# Patient Record
Sex: MALE | Race: WHITE | Employment: FULL TIME | URBAN - METROPOLITAN AREA
[De-identification: names, ages, dates, MRNs, and addresses within clinical notes are randomized per-mention and may not be internally consistent; named-entity substitution may affect disease eponyms.]

---

## 2018-09-06 ENCOUNTER — OFFICE VISIT (OUTPATIENT)
Dept: INTERNAL MEDICINE CLINIC | Age: 23
End: 2018-09-06

## 2018-09-06 VITALS
DIASTOLIC BLOOD PRESSURE: 70 MMHG | HEART RATE: 83 BPM | WEIGHT: 142 LBS | SYSTOLIC BLOOD PRESSURE: 110 MMHG | OXYGEN SATURATION: 99 % | TEMPERATURE: 98.4 F | BODY MASS INDEX: 20.37 KG/M2

## 2018-09-06 DIAGNOSIS — J20.9 ACUTE BRONCHITIS, UNSPECIFIED ORGANISM: Primary | ICD-10-CM

## 2018-09-06 PROCEDURE — 99213 OFFICE O/P EST LOW 20 MIN: CPT | Performed by: INTERNAL MEDICINE

## 2018-09-06 RX ORDER — AZITHROMYCIN 250 MG/1
TABLET, FILM COATED ORAL
Qty: 1 PACKET | Refills: 0 | Status: SHIPPED | OUTPATIENT
Start: 2018-09-06 | End: 2018-09-16

## 2018-09-06 ASSESSMENT — PATIENT HEALTH QUESTIONNAIRE - PHQ9
SUM OF ALL RESPONSES TO PHQ9 QUESTIONS 1 & 2: 0
SUM OF ALL RESPONSES TO PHQ QUESTIONS 1-9: 0
1. LITTLE INTEREST OR PLEASURE IN DOING THINGS: 0
2. FEELING DOWN, DEPRESSED OR HOPELESS: 0
SUM OF ALL RESPONSES TO PHQ QUESTIONS 1-9: 0

## 2018-09-06 ASSESSMENT — ENCOUNTER SYMPTOMS
COUGH: 1
VOMITING: 0
SHORTNESS OF BREATH: 1
NAUSEA: 0

## 2018-09-06 NOTE — PROGRESS NOTES
2018     Keegan Hernandez II (:  1995) is a 23 y.o. male, here for evaluation of the following medical concerns:    Chief Complaint   Patient presents with    Cough        HPI    STARTED 10 days ago  Last 4 days worse  Coughs up clear/white sputum  Loss of appetite  Little rhinitis  Headaches periodically  Dyspnea  No otc meds used  Coughs more with activity  No facial pressure  Post nasal drip  No change in bms, maybe  Little constipated  No n no v    Review of Systems   Constitutional: Positive for appetite change and fatigue. Negative for chills and fever. Respiratory: Positive for cough and shortness of breath. Cardiovascular: Negative for chest pain and leg swelling. Gastrointestinal: Negative for nausea and vomiting. Neurological: Positive for headaches. Negative for syncope. Prior to Visit Medications    Medication Sig Taking? Authorizing Provider   SUMAtriptan (IMITREX) 50 MG tablet TAKE 1 TABLET BY MOUTH AS NEEDED, MAY REPEAT ONCE IN 24 HOURS AT LEAST 2 HOURS AFTER FIRST DOSE  Shandra Boston MD        Social History   Substance Use Topics    Smoking status: Never Smoker    Smokeless tobacco: Never Used    Alcohol use No        Vitals:    18 1136   BP: 110/70   Site: Left Arm   Position: Sitting   Cuff Size: Medium Adult   Pulse: 83   Temp: 98.4 °F (36.9 °C)   TempSrc: Oral   SpO2: 99%   Weight: 142 lb (64.4 kg)     Estimated body mass index is 20.37 kg/m² as calculated from the following:    Height as of 10/19/16: 5' 10\" (1.778 m). Weight as of this encounter: 142 lb (64.4 kg). Physical Exam   Constitutional: He is oriented to person, place, and time. He appears well-developed and well-nourished. HENT:   Head: Normocephalic and atraumatic. Nose: Nose normal.   Mouth/Throat: Oropharynx is clear and moist. No oropharyngeal exudate. Eyes: Pupils are equal, round, and reactive to light. No scleral icterus. Neck: Normal range of motion. Cardiovascular: Normal rate and regular rhythm. Pulmonary/Chest: Effort normal. No respiratory distress. He has no wheezes. He has no rales. Inspiratory rhonchi, scattered  Not dull to percussion   Lymphadenopathy:     He has no cervical adenopathy. Neurological: He is alert and oriented to person, place, and time. Vitals reviewed. ASSESSMENT/PLAN:  1. Acute bronchitis, unspecified organism  Discussed  zpack side effects of the medication were discussed   Please call if symptoms worsen or do not improve. No Follow-up on file. An electronic signature was used to authenticate this note.     --Carey Bowman MD on 9/6/2018 at 11:59 AM

## 2018-09-06 NOTE — LETTER
[facility] [address]  [address]  [place] [address]  Phone: [phone]  Fax: [phone]    Sam Kong MD        September 6, 2018     Patient: Sita Hebert II   YOB: 1995   Date of Visit: 9/6/2018       To Whom It May Concern: It is my medical opinion that Héctor Blackman should refrain from heavy exertion until his bronchitis has resolved. If you have any questions or concerns, please don't hesitate to call.     Sincerely,        Sam Kong MD

## 2019-03-08 ENCOUNTER — OFFICE VISIT (OUTPATIENT)
Dept: INTERNAL MEDICINE CLINIC | Age: 24
End: 2019-03-08
Payer: COMMERCIAL

## 2019-03-08 VITALS
HEART RATE: 97 BPM | BODY MASS INDEX: 21.05 KG/M2 | WEIGHT: 147 LBS | DIASTOLIC BLOOD PRESSURE: 64 MMHG | OXYGEN SATURATION: 100 % | HEIGHT: 70 IN | SYSTOLIC BLOOD PRESSURE: 116 MMHG

## 2019-03-08 DIAGNOSIS — M25.511 CHRONIC RIGHT SHOULDER PAIN: ICD-10-CM

## 2019-03-08 DIAGNOSIS — G56.02 CARPAL TUNNEL SYNDROME OF LEFT WRIST: Primary | ICD-10-CM

## 2019-03-08 DIAGNOSIS — G89.29 CHRONIC RIGHT SHOULDER PAIN: ICD-10-CM

## 2019-03-08 PROCEDURE — 99214 OFFICE O/P EST MOD 30 MIN: CPT | Performed by: INTERNAL MEDICINE

## 2019-03-08 RX ORDER — PREDNISONE 20 MG/1
TABLET ORAL
Qty: 18 TABLET | Refills: 0 | Status: SHIPPED | OUTPATIENT
Start: 2019-03-08 | End: 2019-03-18

## 2019-03-08 RX ORDER — DICLOFENAC SODIUM 75 MG/1
75 TABLET, DELAYED RELEASE ORAL 2 TIMES DAILY
Qty: 60 TABLET | Refills: 3 | Status: SHIPPED | OUTPATIENT
Start: 2019-03-08

## 2019-03-08 ASSESSMENT — PATIENT HEALTH QUESTIONNAIRE - PHQ9
1. LITTLE INTEREST OR PLEASURE IN DOING THINGS: 0
SUM OF ALL RESPONSES TO PHQ QUESTIONS 1-9: 0
SUM OF ALL RESPONSES TO PHQ QUESTIONS 1-9: 0
SUM OF ALL RESPONSES TO PHQ9 QUESTIONS 1 & 2: 0
2. FEELING DOWN, DEPRESSED OR HOPELESS: 0

## 2021-05-14 ENCOUNTER — VIRTUAL VISIT (OUTPATIENT)
Dept: PRIMARY CARE CLINIC | Age: 26
End: 2021-05-14
Payer: COMMERCIAL

## 2021-05-14 DIAGNOSIS — B34.9 ACUTE VIRAL SYNDROME: Primary | ICD-10-CM

## 2021-05-14 DIAGNOSIS — J06.9 URI WITH COUGH AND CONGESTION: ICD-10-CM

## 2021-05-14 PROCEDURE — 99214 OFFICE O/P EST MOD 30 MIN: CPT | Performed by: INTERNAL MEDICINE

## 2021-05-14 RX ORDER — AZITHROMYCIN 250 MG/1
250 TABLET, FILM COATED ORAL SEE ADMIN INSTRUCTIONS
Qty: 6 TABLET | Refills: 0 | Status: SHIPPED | OUTPATIENT
Start: 2021-05-14 | End: 2021-05-19

## 2021-05-14 RX ORDER — GUAIFENESIN AND PSEUDOEPHEDRINE HCL 1200; 120 MG/1; MG/1
TABLET, EXTENDED RELEASE ORAL
Qty: 20 TABLET | Refills: 0 | Status: SHIPPED | OUTPATIENT
Start: 2021-05-14

## 2021-05-14 SDOH — ECONOMIC STABILITY: FOOD INSECURITY: WITHIN THE PAST 12 MONTHS, YOU WORRIED THAT YOUR FOOD WOULD RUN OUT BEFORE YOU GOT MONEY TO BUY MORE.: NEVER TRUE

## 2021-05-14 ASSESSMENT — PATIENT HEALTH QUESTIONNAIRE - PHQ9
SUM OF ALL RESPONSES TO PHQ QUESTIONS 1-9: 0
1. LITTLE INTEREST OR PLEASURE IN DOING THINGS: 0
2. FEELING DOWN, DEPRESSED OR HOPELESS: 0

## 2021-05-14 NOTE — LETTER
[address] [place] [address]  Phone: [phone]  Fax: [phone]    Anuj Lind MD        May 14, 2021     Patient: Kaelyn Andrade II   YOB: 1995   Date of Visit: 5/14/2021       To Whom It May Concern: It is my medical opinion that Juan Carlos Abdul has an acute upper respiratory infection. He needs to be excused from [address] on 5/15 and 5/16/21. .    If you have any questions or concerns, please don't hesitate to call.     Sincerely,        Anuj Lind MD

## 2021-05-14 NOTE — PROGRESS NOTES
Topics    Alcohol use: No    Drug use: No       PHYSICAL EXAMINATION:  [ INSTRUCTIONS:  \"[x]\" Indicates a positive item  \"[]\" Indicates a negative item  -- DELETE ALL ITEMS NOT EXAMINED]  Vital Signs: (As obtained by patient/caregiver or practitioner observation)    Blood pressure- 132/82 Heart rate- 76   Respiratory rate- 12   Temperature- 99.1 Pulse oximetry- normal    Constitutional: [x] Appears well-developed and well-nourished [x] No apparent distress      [] Abnormal-   Mental status  [x] Alert and awake  [x] Oriented to person/place/time [x]Able to follow commands      Eyes:  EOM    [x]  Normal  [] Abnormal-  Sclera  [x]  Normal  [] Abnormal -         Discharge [x]  None visible  [] Abnormal -    HENT:   [x] Normocephalic, atraumatic. [] Abnormal   [x] Mouth/Throat: Mucous membranes are moist.     External Ears [x] Normal  [] Abnormal-     Neck: [x] No visualized mass     Pulmonary/Chest: [x] Respiratory effort normal.  [x] No visualized signs of difficulty breathing or respiratory distress        [] Abnormal-      Musculoskeletal:   [x] Normal gait with no signs of ataxia         [x] Normal range of motion of neck        [] Abnormal-       Neurological:        [x] No Facial Asymmetry (Cranial nerve 7 motor function) (limited exam to video visit)          [x] No gaze palsy        [] Abnormal-         Skin:        [x] No significant exanthematous lesions or discoloration noted on facial skin         [] Abnormal-            Psychiatric:       [x] Normal Affect [x] No Hallucinations        [] Abnormal-     Other pertinent observable physical exam findings-     ASSESSMENT/PLAN:   Assessment/plan  Acute viral syndrome. Considering all the non-specific and wise variety of symptoms, an acute viral illness is most likely. Counseled patient about the treatment options. Most of these symptoms are self-limited. Symptomatic support, fever reduction, analgesics, hydration, rest recommended.  No specific antibiotics are needed. Call us if no better in 1 week. May need further evaluation. Keegan Hernandez II, was evaluated through a synchronous (real-time) audio-video encounter. The patient (or guardian if applicable) is aware that this is a billable service. Verbal consent to proceed has been obtained within the past 12 months. The visit was conducted pursuant to the emergency declaration under the [address] [place] [address] and the [employer] General Act. Patient identification was verified, and a caregiver was present when appropriate. The patient was located in a state where the provider was credentialed to provide care. Total time spent on this encounter: Not billed by time    --Jarod Luna MD on 5/14/2021 at 11:21 AM    An electronic signature was used to authenticate this note.

## 2023-02-06 ENCOUNTER — OFFICE VISIT (OUTPATIENT)
Dept: ORTHOPEDIC SURGERY | Age: 28
End: 2023-02-06

## 2023-02-06 VITALS — WEIGHT: 147 LBS | HEIGHT: 70 IN | BODY MASS INDEX: 21.05 KG/M2

## 2023-02-06 DIAGNOSIS — M76.52 PATELLAR TENDINITIS OF BOTH KNEES: Primary | ICD-10-CM

## 2023-02-06 DIAGNOSIS — S83.232A COMPLEX TEAR OF MEDIAL MENISCUS OF LEFT KNEE AS CURRENT INJURY, INITIAL ENCOUNTER: ICD-10-CM

## 2023-02-06 DIAGNOSIS — M25.562 PAIN IN BOTH KNEES, UNSPECIFIED CHRONICITY: ICD-10-CM

## 2023-02-06 DIAGNOSIS — M76.51 PATELLAR TENDINITIS OF BOTH KNEES: Primary | ICD-10-CM

## 2023-02-06 DIAGNOSIS — M25.561 PAIN IN BOTH KNEES, UNSPECIFIED CHRONICITY: ICD-10-CM

## 2023-02-06 RX ORDER — MELOXICAM 15 MG/1
15 TABLET ORAL DAILY PRN
Qty: 30 TABLET | Refills: 0 | Status: SHIPPED | OUTPATIENT
Start: 2023-02-06

## 2023-02-06 NOTE — PROGRESS NOTES
ORTHOPAEDIC SURGERY H&P / CONSULTATION NOTE    Chief complaint:   Chief Complaint   Patient presents with    Knee Pain     B knee pn      History of present illness: The patient is a 32 y.o. male with subjective symptoms of bilateral knee pain. The chief complaint is located at anterior aspect bilateral knees and anteromedial aspect/medial aspect left greater than right. Duration of symptoms has been for 3 weeks. The severity of symptoms is rated at 6/10 pain but can be as high as 9/10 pain on intake form. Patient was referred by the [facility] MIKALA Herrera  for evaluation and treatment services. He was recently at [employer] starting on January 13. He states that he was required to run daily several miles for roughly 3 weeks. He feels that his symptoms started to increase and worsen on 1/23/2023. He states that he would go running on his own leisure outside the [facility] 2-3 times a week for several miles. He states that many times his medial knee discomfort would occur. He would take a few days off in between running and his symptoms would resolve. He states that this is different with new onset anterior knee pain. He states its been giving him problems with walking. Left greater than right. Denies instability. Denies trauma otherwise. He was taking naproxen without significant alleviation. 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     +  Over-the-counter anti-inflammatories/prescription medication anti-inflammatory.      +  Physical therapy / guided home exercise program 1 visit     -   Previous corticosteroid injections    Past medical history:    Past Medical History:   Diagnosis Date    Sleep terrors     as a child, age 4, not recently, stopped after perp was arrested        Past surgical history:    Past Surgical History:   Procedure Laterality Date    WISDOM TOOTH EXTRACTION          Allergies: Allergies   Allergen Reactions    Amoxicillin-Pot Clavulanate Rash    Pcn [Penicillins] Hives and Rash         Medications:   Current Outpatient Medications:     meloxicam (MOBIC) 15 MG tablet, Take 1 tablet by mouth daily as needed for Pain, Disp: 30 tablet, Rfl: 0     Social history: Denies IV drug use. Social History     Socioeconomic History    Marital status: Single     Spouse name: Not on file    Number of children: Not on file    Years of education: Not on file    Highest education level: Not on file   Occupational History    Not on file   Tobacco Use    Smoking status: Never    Smokeless tobacco: Never   Substance and Sexual Activity    Alcohol use: No    Drug use: No    Sexual activity: Not Currently   Other Topics Concern    Not on file   Social History Narrative    Not on file     Social Determinants of Health     Financial Resource Strain: Not on file   Food Insecurity: Not on file   Transportation Needs: Not on file   Physical Activity: Not on file   Stress: Not on file   Social Connections: Not on file   Intimate Partner Violence: Not on file   Housing Stability: Not on file     Tobacco use. Social History     Tobacco Use   Smoking Status Never   Smokeless Tobacco Never     Employment: [employer]    Workers compensation claim: None    Review of systems: Patient denies any fevers chills chest pain shortness of breath nausea vomiting significant weight loss any change in voiding or bowel movements. Patient denies any significant numbness or tingling at baseline as it relates to this presenting symptom/chief complaint. The patient denies any significant problems with skin or any significant allergies. Physical examination:  Body mass index is 21.09 kg/m².   AAOx3, NCAT  EOMI  MMM  RR  Unlabored breathing, no wheezing  Skin intact BUE and BLE, warm and moist  Bilateral lower extremity examination specific to subjective symptoms  Exam Right Lower Extremity  Negative effusion, 0/125/0 active ROM (E/F/Lag), same P assive ROM (E/F/Lag), negative anterior Drawer, 1A Lachman,   negative posterior Drawer,  Stable varus/valgus at 0 and 30?,    none TTP Joint Line, negative Marry,   positive Hoffa fat pad tenderness anterolateral, positive patellar tendon tenderness mid substance and towards the insertion on the tubercle    Exam Left Lower Extremity  Negative effusion, 0/120/0 active ROM (E/F/Lag), same Passive ROM (E/F/Lag), negative anterior Drawer, 1A Lachman,   negative posterior Drawer,  Stable varus/valgus at 0 and 30?,    none TTP Joint Line, positive medial Marry,   positive Hoffa fat pad tenderness anterolateral, positive patellar tendon tenderness mid substance and towards the insertion on the tubercle    BLE  Skin intact throughout  5/5 IP Q H TA G EHL  SILT DP SP LP MP S S  +2 DP pulse    Diagnostic imaging:  MY READ:  4V B knee 2/6/23: Negative fracture. No gross arthrosis medial or lateral compartment or patellofemoral compartment. Centered and aligned patella. Pertinent lab work:  None       Diagnosis Orders   1. Patellar tendinitis of both knees  meloxicam (MOBIC) 15 MG tablet      2. Complex tear of medial meniscus of left knee as current injury, initial encounter  MRI KNEE LEFT WO CONTRAST    meloxicam (MOBIC) 15 MG tablet      3.  Pain in both knees, unspecified chronicity  XR KNEE RIGHT (3 VIEWS)    XR KNEE LEFT (3 VIEWS)    meloxicam (MOBIC) 15 MG tablet        Assessment and plan: 32 y.o. male with current subjective symptoms and physical exam findings with diagnostic imaging correlating to bilateral knee patellar tendinitis, suspected left knee medial meniscus tear.  -Time of 23 minutes was spent coordinating and discussing the clinical findings, reviewing diagnostic imaging as indicated, coordinating care with prior notes review and current clinical encounter documentation as it pertains to the patient's presenting subjective symptoms and diagnoses. -I reviewed with the patient the imaging findings as well as clinical exam and  how it correlates to subjective symptoms.  -Additional time was taken today to review bilateral knee examination and radiographs. I reviewed with the patient that currently this seems to be an acute on chronic exacerbation with regard to high-impact activity. He was unable to take down days as he is usually accustomed to with regard to his own individual running. It would appear that with doing parachute landing falls training and also with high impact activity on a daily basis that increased inflammation had occurred essentially as patellar tendinitis  -I reviewed with him nonoperative treatment options at this time as its been roughly 2 weeks worth of pain. -Mobic 15 mg p.o. daily as needed pain and OTC Tylenol per bottle as needed discomfort. He was instructed to stop naproxen prescription  -Physician directed physical therapy was printed out given to the patient to include perineal reconditioning exercises. Recommended eccentric strength and stretching activities  -I recommend low impact activity elliptical stationary bike swimming and walking. Crutches were offered but the patient denied given he is slightly limping on the left side but otherwise he feels he is able to ambulate, slowly  -MRI ordered of the left knee to evaluate for medial meniscus tear  -Consideration for Cho-Pat strap and patient states that he has them. He may use this for patellar tendinitis. Could also be an element of low-grade chondromalacia is appreciated given the patient's dull throbbing aching pain and symptoms with impact activity on his own.   Again acute on chronic exacerbation suspected and the above listed conservative care to address patellar tendinitis and evaluate for medial meniscus tear  -All questions answered to the patient's satisfaction and the patient expressed understanding and agreement with the above listed treatment plan  -Follow up in after MRI completed to review results  -Thank you for the clinical consultation and allowing me to participate in the patient's care. Electronically signed by Reece Juan MD on 2/6/23 at 9:55 AM SONJA Juan MD       Orthopaedic Surgery-Sports Medicine        Disclaimer: This note was dictated with voice recognition software. Though review and correction are routinely performed, please contact the office/medical records for any errors requiring correction.

## 2023-02-17 ENCOUNTER — TELEPHONE (OUTPATIENT)
Dept: ORTHOPEDIC SURGERY | Age: 28
End: 2023-02-17

## 2023-02-17 NOTE — TELEPHONE ENCOUNTER
Medical Facility Question     [facility]ility Name: [facility] Name: [facility]  Contact Number: [phone]  Request or Information: CHECKING TO SEE IF YOU RECEIVED THE MRI ON L KNEE. #REQUESTING A CALL BACK.

## 2023-02-20 ENCOUNTER — TELEPHONE (OUTPATIENT)
Dept: ORTHOPEDIC SURGERY | Age: 28
End: 2023-02-20

## 2023-02-20 NOTE — TELEPHONE ENCOUNTER
Requested MRI is pushed through to [facility], not currently in system to view.  KB

## 2023-02-24 ENCOUNTER — OFFICE VISIT (OUTPATIENT)
Dept: ORTHOPEDIC SURGERY | Age: 28
End: 2023-02-24
Payer: OTHER GOVERNMENT

## 2023-02-24 VITALS — HEIGHT: 70 IN | BODY MASS INDEX: 21.05 KG/M2 | WEIGHT: 147 LBS

## 2023-02-24 DIAGNOSIS — M76.52 PATELLAR TENDINITIS OF BOTH KNEES: ICD-10-CM

## 2023-02-24 DIAGNOSIS — M84.362A STRESS FRACTURE OF LEFT TIBIA, INITIAL ENCOUNTER: ICD-10-CM

## 2023-02-24 DIAGNOSIS — M84.362A STRESS FRACTURE OF LEFT TIBIA, INITIAL ENCOUNTER: Primary | ICD-10-CM

## 2023-02-24 DIAGNOSIS — M76.51 PATELLAR TENDINITIS OF BOTH KNEES: ICD-10-CM

## 2023-02-24 LAB — VITAMIN D 25-HYDROXY: 24.6 NG/ML

## 2023-02-24 PROCEDURE — 99213 OFFICE O/P EST LOW 20 MIN: CPT | Performed by: ORTHOPAEDIC SURGERY

## 2023-02-24 RX ORDER — MELOXICAM 15 MG/1
15 TABLET ORAL PRN
Qty: 30 TABLET | Refills: 0 | Status: SHIPPED | OUTPATIENT
Start: 2023-02-24

## 2023-02-24 RX ORDER — MELOXICAM 15 MG/1
15 TABLET ORAL PRN
Qty: 30 TABLET | Refills: 0 | Status: CANCELLED | OUTPATIENT
Start: 2023-02-24

## 2023-02-24 NOTE — PROGRESS NOTES
FOLLOW UP ORTHOPAEDIC NOTE    The patient follows up today for reevaluation of left knee pain. The patient states 2/10 pain. He received his MRI and is here to review the results. He states that the Mobic prescription previously provided has helped his anterior knee pain in association with patellar tendinitis. Upon further discussion he states that he had been having pain in both knees with running. 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 He states that he would be running 2-3 times a week for 3 to 5 miles. He states while at airborne training he was running every day 3 to 5 miles. He states the medial aspect of the left knee has had some occasional on again off again discomfort still. He denies significant swelling at this time. Occasional sharp pain left knee. PE:  AAOx3  RR  Unlabored breathing  Skin warm and moist  Focused physical examination of the left knee  No gross effusion. Nontender to palpation actual joint line. Nontender to palpation proximal tibial medial aspect    Pertinent radiographs/imaging:  MRI left knee 2/17/2023:  CONCLUSION:   Nondisplaced tibial fracture dominant component is medial with a subtle extension to the    anterior tibia to Hoffa's fat pad but no articular stepoff. MY READ: No medial or lateral meniscus tear. There is grade 2 signal changes in the posterior horn of the medial meniscus. ACL PCL LCL MCL intact. Very small Baker's cyst appreciated. Of note he does have edema in the proximal medial aspect of the tibia with a serpiginous dense line on the medial tibial plateau suggestive of an incomplete stress fracture in the metaphysis. This does extend towards the anterior tibia proximally. Right patellar tendinosis at the origin patellar tendon with slight edema in the Hoffa fat pad. No gross chondromalacia medial/lateral/patellofemoral compartment     Diagnosis Orders   1.  Stress fracture of left tibia, initial encounter Vitamin D 25 Hydroxy    Aluminum Crutches    [facility]    meloxicam (MOBIC) 15 MG tablet      2. Patellar tendinitis of both knees  Aluminum Crutches    [facility]    meloxicam (MOBIC) 15 MG tablet          Assessment and plan: 32 male with continued subjective symptoms of left knee pain however still also with right knee pain with known, correlating diagnosis of left knee proximal tibial metaphyseal stress reaction/stress fracture. -Time of 23 minutes was spent coordinating and discussing the clinical findings and diagnostic imaging results as they pertain to the patient's presenting subjective symptoms.  -Additional time was taken today to review with the patient his imaging directly with him. I reviewed with him that his MRI imaging does not show any gross meniscal pathology. That was previous concern. Of interest and upon further discussion and review with regard to the amount of tibial metaphyseal edema, he has stated that he was running a good amount prior to boot camp where he was having pain in both knees. While this has improved slightly with regard to the baseline patellar tendinitis that is present, it would appear that he does have a tibial stress reaction/possible previous incomplete stress fracture that he is doing better with symptomatically without gross pain on examination. I reviewed with him that this edema would persist for 6 to 12 weeks on imaging. We have not been protecting his weightbearing status given this was not a concern of initial and would have 3 review of the radiographs there is very subtle dense sclerosis there however this is along the previous physeal scar which obscures it.  -I would like to obtain vitamin D levels on him to review to see if he is insufficient or deficient. It is encouraging that he does not have significant amount of discomfort with walking/low impact activities.   I will contact him with the results and should we need to start vitamin D replacement therapy we will do so at that time to aid in overall healing given the unique injury for what it is is a stress reaction versus stress fracture with high impact activity  -Having said that, given this is roughly 5 weeks out from the suspected injury, I would provide him some crutches to be utilizing 50% partial weightbearing on the left lower extremity for the next 4 weeks. After that he may wean from 2 crutch to 1 crutch 1 crutch to no crutch having advanced to weightbearing as tolerated. This will be to provide some bone rest while overuse stress reaction resolves  -Additional prescription of Mobic 15 mg p.o. daily as needed pain and OTC Tylenol per bottle as needed discomfort has been prescribed for overall symptomatic treatment. Ice and elevation/heating is also acceptable for symptomatic resolution. Thankfully there is no significant effusion on examination today  -A work note was provided stating that he is under medical care for left knee injury and I recommended crutch use 50% partial weightbearing for 4 weeks.  -Formal physical therapy is also been prescribed to work on GALI knee reconditioning and strengthening to include eccentric stretching activities and exercise given patellar tendinitis bilaterally. This is for bilateral knee treatment.   I suspect no more than 2-4 visits initially and then him having a home exercise program to be done and then follow-up 6 weeks thereafter to check in to see about obtaining additional assessment and home exercise program for a visit or 2.  -At this time there is nothing surgical from a treatment option perspective.  -All questions answered to the patient's satisfaction and the patient expressed understanding and agreement with the above listed treatment plan  -Follow up in 8 weeks time for repeat clinical examination after he is weaned off the crutches and weightbearing as tolerated likely for 2 to 4 weeks at that time.  -Thank you for the clinical consultation and allowing me to participate in the patient's care. Electronically signed by Sofia Hicks MD on 2/24/23 at 9:50 AM SONJA Hicks MD       Orthopaedic Surgery-Sports Medicine    Disclaimer: This note was dictated with voice recognition software. Though review and correction are routinely performed, please contact the office/medical records for any errors requiring correction.

## 2023-02-24 NOTE — LETTER
[address] [place] [address] [address] [place] [address]  Phone: [phone]  Fax: [phone]    Ace Vines MD        February 24, 2023     Patient: Richie Miles II   YOB: 1995   Date of Visit: 2/24/2023       To Whom It May Concern: It is my medical opinion that Theone Nicholas should remain 50% weight bearing with assistive device use for 4 weeks. If you have any questions or concerns, please don't hesitate to call.     Sincerely,             Ace Vines MD       Orthopaedic Surgery-Sports Medicine  Ace Vines MD

## 2023-02-25 RX ORDER — ERGOCALCIFEROL 1.25 MG/1
50000 CAPSULE ORAL WEEKLY
Qty: 12 CAPSULE | Refills: 0 | Status: SHIPPED | OUTPATIENT
Start: 2023-02-25

## 2023-03-01 ENCOUNTER — PATIENT MESSAGE (OUTPATIENT)
Dept: ORTHOPEDIC SURGERY | Age: 28
End: 2023-03-01

## 2023-03-01 ENCOUNTER — TELEPHONE (OUTPATIENT)
Dept: ORTHOPEDIC SURGERY | Age: 28
End: 2023-03-01

## 2023-03-01 NOTE — TELEPHONE ENCOUNTER
Other PATIENT STATES THAT HE IS NEEDING A DOCTORS NOTE BY TOMORROW. 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  PLS CALL TO [facility] [phone]

## 2023-03-01 NOTE — TELEPHONE ENCOUNTER
Spoke with patient and advised that he can print his visit note from 2/24/23 and letter from that same date from his mychart and provide to them. Patient felt this was acceptable and will move forward with that. Advised patient if anything else was needed or a more detailed letter to reach out to us and we will help him.

## 2023-03-02 NOTE — TELEPHONE ENCOUNTER
From: Fadi Hall II  To: Dr. Edna Hidalgo: 3/1/2023 1:48 PM EST  Subject: Doctor Note for [employer]. Could you possibly write another doctor's note stating no physical fitness, ruck marching, or high impact activities? My unit usually wants specific activies prohibited. They are planning for a PT test and 12 mile ruck this weekend this would just help prevent any misunderstanding. Thanks.

## 2023-04-05 ENCOUNTER — HOSPITAL ENCOUNTER (OUTPATIENT)
Dept: PHYSICAL THERAPY | Age: 28
Setting detail: THERAPIES SERIES
Discharge: HOME | End: 2023-04-05
Payer: OTHER GOVERNMENT

## 2023-04-05 PROCEDURE — 97110 THERAPEUTIC EXERCISES: CPT

## 2023-04-05 PROCEDURE — 97112 NEUROMUSCULAR REEDUCATION: CPT

## 2023-04-05 PROCEDURE — 97161 PT EVAL LOW COMPLEX 20 MIN: CPT

## 2023-04-05 NOTE — PLAN OF CARE
and now transitioned to weight bearing restrictions per MD. Stated now gets tightness medial aspect of thighs. Stated hasn't run in a couple months, stated does have about another month of this. Stated plans to return to training in June. 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 OBJECTIVE:     ROM LEFT RIGHT   HIP Flex     HIP Abd     HIP Ext     HIP IR     HIP ER     Knee ext 0 0   Knee Flex 140 140   Ankle PF     Ankle DF     Ankle In     Ankle Ev     Strength  LEFT RIGHT   HIP Flexors 5/5 4+/5   HIP Abductors     HIP Ext     Hip ER     Knee EXT (quad) 4+/5 5/5   Knee Flex (HS)     Ankle DF     Ankle PF     Ankle Inv     Ankle EV          Circumference  Mid apex  7 cm prox             Reflexes/Sensation:    [x]Dermatomes/Myotomes intact    [x]Reflexes equal and normal bilaterally   []Other:    Joint mobility:    []Normal    []Hypo   []Hyper    Palpation: denies    Functional Mobility/Transfers: I    Posture: WFL    Bandages/Dressings/Incisions: NA    Gait: (include devices/WB status) I no AD    Orthopedic Special Tests:                        [x] Patient history, allergies, meds reviewed. Medical chart reviewed. See intake form. Review Of Systems (ROS):  [x]Performed Review of systems (Integumentary, CardioPulmonary, Neurological) by intake and observation. Intake form has been scanned into medical record. Patient has been instructed to contact their primary care physician regarding ROS issues if not already being addressed at this time.       Co-morbidities/Complexities (which will affect course of rehabilitation):   [x]None           Arthritic conditions   []Rheumatoid arthritis

## 2023-05-04 ENCOUNTER — HOSPITAL ENCOUNTER (OUTPATIENT)
Dept: PHYSICAL THERAPY | Age: 28
Setting detail: THERAPIES SERIES
Discharge: HOME | End: 2023-05-04
Payer: OTHER GOVERNMENT

## 2023-05-04 ENCOUNTER — OFFICE VISIT (OUTPATIENT)
Dept: ORTHOPEDIC SURGERY | Age: 28
End: 2023-05-04

## 2023-05-04 DIAGNOSIS — S86.892D LEFT MEDIAL TIBIAL STRESS SYNDROME, SUBSEQUENT ENCOUNTER: Primary | ICD-10-CM

## 2023-05-04 DIAGNOSIS — Z00.00 WELLNESS EXAMINATION: ICD-10-CM

## 2023-05-04 PROCEDURE — 97110 THERAPEUTIC EXERCISES: CPT

## 2023-05-04 PROCEDURE — 97112 NEUROMUSCULAR REEDUCATION: CPT

## 2023-05-04 RX ORDER — ERGOCALCIFEROL 1.25 MG/1
50000 CAPSULE ORAL WEEKLY
Qty: 6 CAPSULE | Refills: 0 | Status: SHIPPED | OUTPATIENT
Start: 2023-05-04

## 2023-05-04 NOTE — PROGRESS NOTES
and potential consideration should it still be insufficient or even deficient at that time for further vitamin D dosing  -Otherwise he may slowly return to impact activity with increasing frequency. Any significant symptoms he is to back off of it to go back to low impact activity  -Otherwise a note was provided stating that he can return to activities as tolerated as he wishes to still complete jump school. 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 He expressed understanding and will see how he does. -OTC Tylenol Aleve per bottle as needed discomfort  -Advocated for activity modification as symptoms require  -All questions answered to the patient's satisfaction and the patient expressed understanding and agreement with the above listed treatment plan  -Follow up in as needed  -Thank you for the clinical consultation and allowing me to participate in the patient's care. Electronically signed by Og Mcclendon MD on 5/4/23 at 12:41 PM EDT         Og Mcclendon MD       Orthopaedic Surgery-Sports Medicine    Disclaimer: This note was dictated with voice recognition software. Though review and correction are routinely performed, please contact the office/medical records for any errors requiring correction.

## 2023-05-04 NOTE — FLOWSHEET NOTE
noted    Exercises/Interventions:   Therapeutic Ex (14603) Sets/sec Reps Notes/CUES HEP   HS mobility step  10ea     SLR with quad sets  15ea     Bridge with ball sqz  15     SSLR  15ea     Clam shell  15ea       LSD   6in   20ea     LINDSEY- # 2x10ea     Bike 6 min      LINDSEY- abd 60# 2x10ea     Lateral band walk  Knee ext ecc lower  HS curl  Leg press  ecc   30#  60#  160#  120# 3 laps  3x10ea  3x10  3x10  2x10ea Lime, vc           Pt education 10 min  Reviewed HEP, goals of PT, not to push through pain with ex or activity, use of ice- pt stated understanding    Manual Intervention (28627)                                                 NMR re-education (97036)   CUES NEEDED                                                                   Therapeutic Activity (08670)                                          EverConnect access code: [email or web address]           Therapeutic Exercise and NMR EXR  [x] (11009) Provided verbal/tactile cueing for activities related to strengthening, flexibility, endurance, ROM for improvements in LE, proximal hip, and core control with self care, mobility, lifting, ambulation. [x] (66388) Provided verbal/tactile cueing for activities related to improving balance, coordination, kinesthetic sense, posture, motor skill, proprioception to assist with LE, proximal hip, and core control in self-care, mobility, lifting, ambulation and eccentric single leg control.      NMR and Therapeutic Activities:    [x] (28726 or 59282) Provided verbal/tactile cueing for activities related to improving balance, coordination, kinesthetic sense, posture, motor skill, proprioception and motor activation to allow for proper function of core, proximal hip and LE with self-care and ADLs and functional mobility.   [] (13889) Gait Re-education- Provided training and instruction to the patient for proper LE, core and proximal hip recruitment and positioning and eccentric body weight control with ambulation re-education

## 2023-06-01 ENCOUNTER — OFFICE VISIT (OUTPATIENT)
Dept: FAMILY MEDICINE CLINIC | Age: 28
End: 2023-06-01
Payer: OTHER GOVERNMENT

## 2023-06-01 VITALS
DIASTOLIC BLOOD PRESSURE: 72 MMHG | WEIGHT: 156.4 LBS | HEART RATE: 90 BPM | OXYGEN SATURATION: 97 % | HEIGHT: 70 IN | SYSTOLIC BLOOD PRESSURE: 132 MMHG | BODY MASS INDEX: 22.39 KG/M2

## 2023-06-01 DIAGNOSIS — Z23 NEED FOR TDAP VACCINATION: ICD-10-CM

## 2023-06-01 DIAGNOSIS — R53.83 OTHER FATIGUE: ICD-10-CM

## 2023-06-01 DIAGNOSIS — M25.511 ACUTE PAIN OF RIGHT SHOULDER: ICD-10-CM

## 2023-06-01 DIAGNOSIS — E55.9 VITAMIN D DEFICIENCY: ICD-10-CM

## 2023-06-01 DIAGNOSIS — Z00.00 WELL ADULT EXAM: Primary | ICD-10-CM

## 2023-06-01 PROBLEM — H52.10 MYOPIA: Status: ACTIVE | Noted: 2023-06-01

## 2023-06-01 PROCEDURE — 90715 TDAP VACCINE 7 YRS/> IM: CPT | Performed by: STUDENT IN AN ORGANIZED HEALTH CARE EDUCATION/TRAINING PROGRAM

## 2023-06-01 PROCEDURE — 99203 OFFICE O/P NEW LOW 30 MIN: CPT | Performed by: STUDENT IN AN ORGANIZED HEALTH CARE EDUCATION/TRAINING PROGRAM

## 2023-06-01 PROCEDURE — 99385 PREV VISIT NEW AGE 18-39: CPT | Performed by: STUDENT IN AN ORGANIZED HEALTH CARE EDUCATION/TRAINING PROGRAM

## 2023-06-01 PROCEDURE — 90471 IMMUNIZATION ADMIN: CPT | Performed by: STUDENT IN AN ORGANIZED HEALTH CARE EDUCATION/TRAINING PROGRAM

## 2023-06-01 RX ORDER — NAPROXEN 500 MG/1
TABLET ORAL
COMMUNITY
Start: 2023-01-26 | End: 2023-06-01

## 2023-06-01 ASSESSMENT — PATIENT HEALTH QUESTIONNAIRE - PHQ9
SUM OF ALL RESPONSES TO PHQ QUESTIONS 1-9: 0
2. FEELING DOWN, DEPRESSED OR HOPELESS: 0
SUM OF ALL RESPONSES TO PHQ QUESTIONS 1-9: 0
SUM OF ALL RESPONSES TO PHQ9 QUESTIONS 1 & 2: 0
SUM OF ALL RESPONSES TO PHQ QUESTIONS 1-9: 0
SUM OF ALL RESPONSES TO PHQ QUESTIONS 1-9: 0
1. LITTLE INTEREST OR PLEASURE IN DOING THINGS: 0

## 2023-06-01 NOTE — PROGRESS NOTES
[facility]  Establish care visit   2023    Yolanda Hernandez II (:  1995) is a 32 y.o. male, here to establish care. Chief Complaint   Patient presents with    Establish Care    Other     Discuss Vit D levels        ASSESSMENT/ PLAN  1. Well adult exam  General wellness exam. Reviewed chart for past hx and updated today. Counseled on age appropriate health guidance and discussed screening recommendations. Vaccinations reviewed and discussed. All questions answered      2. Need for Tdap vaccination  - Tdap, BOOSTRIX, (age 8 yrs+), IM    3. Vitamin D deficiency  Previous history of deficiency. On vitamin D supplementation. Will complete last 3 doses and then get vitamin D level measured. - Vitamin D 25 Hydroxy; Future    4. Other fatigue  - CBC; Future  - Iron and TIBC; Future    5. Acute pain of right shoulder  [facility]       No follow-ups on file. HPI  Patient is a 55-year-old male, who grew up in [place] [address] and now lives in [place]. He reports that he has been 3 to 4 years since he last saw his PCP. Patient would like to establish care with a well adult visit and to discuss a few concerns. In regard to his well adult visit, the patient reports that he does not follow any particular diet. For exercise, he was running before his injury where he had a stress fracture to the tibia. He he has now been cleared by the orthopedist and is getting back into running. Regarding vaccinations, he received his childhood vaccines, got the original COVID-vaccine, and gets annual flu vaccines. He does not use any tobacco products. He does not use drugs. He does use alcohol occasionally, 1-2 drinks per week. He is sexually active, but declines STD screening at this time. Patient also reports that he has a history of allergies, has been taking antihistamine, but reports that this dries him out.   He has not tried Flonase in the

## 2023-07-06 ENCOUNTER — HOSPITAL ENCOUNTER (OUTPATIENT)
Dept: PHYSICAL THERAPY | Age: 28
Setting detail: THERAPIES SERIES
Discharge: HOME | End: 2023-07-06
Payer: OTHER GOVERNMENT

## 2023-07-06 PROCEDURE — 97016 VASOPNEUMATIC DEVICE THERAPY: CPT | Performed by: SPECIALIST

## 2023-07-06 PROCEDURE — 97112 NEUROMUSCULAR REEDUCATION: CPT | Performed by: SPECIALIST

## 2023-07-06 PROCEDURE — 97110 THERAPEUTIC EXERCISES: CPT | Performed by: SPECIALIST

## 2023-07-06 PROCEDURE — 97161 PT EVAL LOW COMPLEX 20 MIN: CPT | Performed by: SPECIALIST

## 2023-07-06 NOTE — FLOWSHEET NOTE
5. Sleep with min to no limitations (patient specific functional goal)    [x] Progressing: [] Met: [] Not Met: [] Adjusted           Overall Progression Towards Functional goals/ Treatment Progress Update:  [] Patient is progressing as expected towards functional goals listed. [] Progression is slowed due to complexities/Impairments listed. [] Progression has been slowed due to co-morbidities. [x] Plan just implemented, too soon to assess goals progression <30days   [] Goals require adjustment due to lack of progress  [] Patient is not progressing as expected and requires additional follow up with physician  [] Other    Prognosis for POC: [x] Good [] Fair  [] Poor      Patient requires continued skilled intervention: [x] Yes  [] No    Treatment/Activity Tolerance:  [x] Patient able to complete treatment  [] Patient limited by fatigue  [] Patient limited by pain    [] Patient limited by other medical complications  [] Other:       PLAN: See eval  [] Continue per plan of care [] Alter current plan (see comments above)  [x] Plan of care initiated [] Hold pending MD visit [] Discharge    Electronically signed by:  Ale Gallego PT    Note: If patient does not return for scheduled/ recommended follow up visits, this note will serve as a discharge from care along with most recent update on progress.

## 2023-07-06 NOTE — PLAN OF CARE
Pulmonary conditions   []Asthma (J45)  []Coughing   []COPD (J44.9)   Psychological Disorders  []Anxiety (F41.9)  []Depression (F32.9)   []Other:   []Other:          Barriers to/and or personal factors that will affect rehab potential:              []Age  []Sex              []Motivation/Lack of Motivation                        []Co-Morbidities              []Cognitive Function, education/learning barriers              []Environmental, home barriers              []profession/work barriers  []past PT/medical experience  []other:  Justification:      Falls Risk Assessment (30 days):   [x] Falls Risk assessed and no intervention required.   [] Falls Risk assessed and Patient requires intervention due to being higher risk   TUG score (>12s at risk):     [] Falls education provided, including       G-Codes:       ASSESSMENT:   Functional Impairments   []Noted spinal or UE joint hypomobility   []Noted spinal or UE joint hypermobility   [x]Decreased UE functional ROM   [x]Decreased UE functional strength   []Abnormal reflexes/sensation/myotomal/dermatomal deficits   [x]Decreased RC/scapular/core strength and neuromuscular control   []other:      Functional Activity Limitations (from functional questionnaire and intake)   [x]Reduced ability to tolerate prolonged functional positions   [x]Reduced ability or difficulty with changes of positions or transfers between positions   [x]Reduced ability to maintain good posture and demonstrate good body mechanics with sitting, bending, and lifting   [x] Reduced ability or tolerance with driving and/or computer work   [x]Reduced ability to sleep   [x]Reduced ability to perform lifting, reaching, carrying tasks   [x]Reduced ability to tolerate impact through UE   [x]Reduced ability to reach behind back   [x]Reduced ability to  or hold objects   [x]Reduced ability to throw or toss an object   []other:    Participation Restrictions   [x]Reduced participation in self care

## 2023-07-12 ENCOUNTER — HOSPITAL ENCOUNTER (OUTPATIENT)
Dept: PHYSICAL THERAPY | Age: 28
Setting detail: THERAPIES SERIES
Discharge: HOME | End: 2023-07-12
Payer: OTHER GOVERNMENT

## 2023-07-12 PROCEDURE — 97112 NEUROMUSCULAR REEDUCATION: CPT | Performed by: SPECIALIST/TECHNOLOGIST

## 2023-07-12 PROCEDURE — 97016 VASOPNEUMATIC DEVICE THERAPY: CPT | Performed by: SPECIALIST/TECHNOLOGIST

## 2023-07-12 PROCEDURE — 97110 THERAPEUTIC EXERCISES: CPT | Performed by: SPECIALIST/TECHNOLOGIST

## 2023-07-12 PROCEDURE — 97140 MANUAL THERAPY 1/> REGIONS: CPT | Performed by: SPECIALIST/TECHNOLOGIST

## 2023-07-12 NOTE — FLOWSHEET NOTE
increasing ROM, reducing/eliminating soft tissue swelling/inflammation/restriction, improving soft tissue extensibility and allowing for proper ROM for normal function with self care, reaching, carrying, lifting, house/yardwork, driving/computer work    Modalities:     [x] GAME READY (VASO)- for significant edema, swelling, pain control. Charges:  Timed Code Treatment Minutes: 38   Total Treatment Minutes:  48   BWC:  TE TIME:  NMR TIME:  MANUAL TIME:  UNTIMED MINUTES:  Medicare Total:        EVAL (LOW) 04903 (typically 20 minutes face-to-face)  [] EVAL (MOD) 27150 (typically 30 minutes face-to-face)  [] EVAL (HIGH) 68697 (typically 45 minutes face-to-face)  [] RE-EVAL     [x] HR(89144) x    1 [] IONTO  [x] NMR (37985) x    1 [x] VASO  [x] Manual (97167) x    1 [] Other:  [] TA x      [] Mech Traction (44520)  [] ES(attended) (60784)      [] ES (un) (33749):    ASSESSMENT:  Good exercise tolerance. Added prone series on swiss ball and scapular mobs. GOALS:     Patient stated goal: sleep     Therapist goals for Patient:   Short Term Goals: To be achieved in: 2 weeks  1. Independent in HEP and progression per patient tolerance, in order to prevent re-injury. [x] Progressing: [] Met: [] Not Met: [] Adjusted      2. Patient will have a decrease in pain to facilitate improvement in movement, function, and ADLs as indicated by Functional Deficits. [x] Progressing: [] Met: [] Not Met: [] Adjusted      Long Term Goals: To be achieved in: 6 weeks  1. FOTO score will match or exceed predicted score to assist with reaching prior level of function. [x] Progressing: [] Met: [] Not Met: [] Adjusted      2. Patient will demonstrate increased AROM to WNL to allow for proper joint functioning as indicated by patients Functional Deficits. [x] Progressing: [] Met: [] Not Met: [] Adjusted      3.  Patient will demonstrate an increase in Strength to 5/5 to allow for proper functional mobility as indicated by patients

## 2023-07-21 ENCOUNTER — HOSPITAL ENCOUNTER (OUTPATIENT)
Dept: PHYSICAL THERAPY | Age: 28
Setting detail: THERAPIES SERIES
Discharge: HOME | End: 2023-07-21
Payer: OTHER GOVERNMENT

## 2023-07-21 PROCEDURE — 97016 VASOPNEUMATIC DEVICE THERAPY: CPT | Performed by: SPECIALIST/TECHNOLOGIST

## 2023-07-21 PROCEDURE — 97140 MANUAL THERAPY 1/> REGIONS: CPT | Performed by: SPECIALIST/TECHNOLOGIST

## 2023-07-21 PROCEDURE — 97112 NEUROMUSCULAR REEDUCATION: CPT | Performed by: SPECIALIST/TECHNOLOGIST

## 2023-07-21 PROCEDURE — 97110 THERAPEUTIC EXERCISES: CPT | Performed by: SPECIALIST/TECHNOLOGIST

## 2023-07-21 NOTE — FLOWSHEET NOTE
[address] [facility], [facility] [address]  Phone: [phone]   Fax:     [phone]      Physical Therapy Treatment Note/ Progress Report:     Date:  2023    Patient Name:  Nakia Romero    :  1995  MRN: [id number]  Restrictions/Precautions:    Medical/Treatment Diagnosis Information:      M25.511 (ICD-10-CM) - Acute pain of right shoulder    Insurance/Certification information:   [facility]  Physician Information:   Shane Serra MD  Has the plan of care been signed (Y/N):        []  Yes  [x]  No     Date of Patient follow up with Physician: NS    Is this a Progress Report:     []  Yes  [x]  No     If Yes:  Date Range for reporting period:  Beginnin23 ------------ Endin23    Progress report will be due (10 Rx or 30 days whichever is less): 6/3/82     Recertification will be due (POC Duration  / 90 days whichever is less): 10/6/23      Visit # Insurance Allowable Auth Required   In Person 3 bmn []  Yes     []  No    Tele Health 0  []  Yes     []  No    Total 3       FOTO Score: Quick Dash 11%     Date assessed:  23      Latex Allergy:  [x]NO      []YES  Preferred Language for Healthcare:   [x]English       []other:    Pain level:  2/10     SUBJECTIVE:  Pt notes his shoulder feels tight on the back near his shoulder blade.      OBJECTIVE: See eval  Observation:   Test measurements:      RESTRICTIONS/PRECAUTIONS: none    Exercises/Interventions:   Therapeutic Ex (93141) Sets/sec Reps Notes/CUES HEP   Cane 90/90 ER 20\" 2x  x   IR towel 20 2x  x   Wall walk abd  10x  x   Syling IR 10\" 10x     Prone exercise on swiss ball  Ext  W  Y    X30  X30  x30   2#  2#  2#    Serratus punches  x30 10#                                              Manual Intervention (37774)       Scapular mobs  x8'                                        NMR re-education (44536)   CUES NEEDED    W/Y  15x  x   SL ER/

## 2023-07-26 ENCOUNTER — HOSPITAL ENCOUNTER (OUTPATIENT)
Dept: PHYSICAL THERAPY | Age: 28
Setting detail: THERAPIES SERIES
Discharge: HOME | End: 2023-07-26
Payer: OTHER GOVERNMENT

## 2023-07-26 DIAGNOSIS — R53.83 OTHER FATIGUE: ICD-10-CM

## 2023-07-26 DIAGNOSIS — E55.9 VITAMIN D DEFICIENCY: ICD-10-CM

## 2023-07-26 LAB
25(OH)D3 SERPL-MCNC: 36.8 NG/ML
DEPRECATED RDW RBC AUTO: 13.5 % (ref 12.4–15.4)
HCT VFR BLD AUTO: 42.3 % (ref 40.5–52.5)
HGB BLD-MCNC: 14.6 G/DL (ref 13.5–17.5)
IRON SATN MFR SERPL: 26 % (ref 20–50)
IRON SERPL-MCNC: 78 UG/DL (ref 59–158)
MCH RBC QN AUTO: 30.8 PG (ref 26–34)
MCHC RBC AUTO-ENTMCNC: 34.6 G/DL (ref 31–36)
MCV RBC AUTO: 88.8 FL (ref 80–100)
PLATELET # BLD AUTO: 131 K/UL (ref 135–450)
PMV BLD AUTO: 10.7 FL (ref 5–10.5)
RBC # BLD AUTO: 4.76 M/UL (ref 4.2–5.9)
TIBC SERPL-MCNC: 295 UG/DL (ref 260–445)
WBC # BLD AUTO: 4.4 K/UL (ref 4–11)

## 2023-07-26 PROCEDURE — 97112 NEUROMUSCULAR REEDUCATION: CPT | Performed by: SPECIALIST/TECHNOLOGIST

## 2023-07-26 PROCEDURE — 97016 VASOPNEUMATIC DEVICE THERAPY: CPT | Performed by: SPECIALIST/TECHNOLOGIST

## 2023-07-26 PROCEDURE — 97110 THERAPEUTIC EXERCISES: CPT | Performed by: SPECIALIST/TECHNOLOGIST

## 2023-07-26 PROCEDURE — 97140 MANUAL THERAPY 1/> REGIONS: CPT | Performed by: SPECIALIST/TECHNOLOGIST

## 2023-07-26 NOTE — FLOWSHEET NOTE
the purpose of modulating pain, promoting relaxation,  increasing ROM, reducing/eliminating soft tissue swelling/inflammation/restriction, improving soft tissue extensibility and allowing for proper ROM for normal function with self care, reaching, carrying, lifting, house/yardwork, driving/computer work    Modalities:     [x] GAME READY (VASO)- for significant edema, swelling, pain control. Charges:  Timed Code Treatment Minutes: 38   Total Treatment Minutes:  48   BWC:  TE TIME:  NMR TIME:  MANUAL TIME:  UNTIMED MINUTES:  Medicare Total:        EVAL (LOW) 09328 (typically 20 minutes face-to-face)  [] EVAL (MOD) 18094 (typically 30 minutes face-to-face)  [] EVAL (HIGH) 44684 (typically 45 minutes face-to-face)  [] RE-EVAL     [x] EH(42821) x    1 [] IONTO  [x] NMR (89740) x    1 [x] VASO  [x] Manual (20004) x    1 [] Other:  [] TA x      [] Mech Traction (55810)  [] ES(attended) (79832)      [] ES (un) (31140):    ASSESSMENT:  Good exercise tolerance. Added prone series on swiss ball and scapular mobs. GOALS:     Patient stated goal: sleep     Therapist goals for Patient:   Short Term Goals: To be achieved in: 2 weeks  1. Independent in HEP and progression per patient tolerance, in order to prevent re-injury. [x] Progressing: [] Met: [] Not Met: [] Adjusted      2. Patient will have a decrease in pain to facilitate improvement in movement, function, and ADLs as indicated by Functional Deficits. [x] Progressing: [] Met: [] Not Met: [] Adjusted      Long Term Goals: To be achieved in: 6 weeks  1. FOTO score will match or exceed predicted score to assist with reaching prior level of function. [x] Progressing: [] Met: [] Not Met: [] Adjusted      2. Patient will demonstrate increased AROM to WNL to allow for proper joint functioning as indicated by patients Functional Deficits. [x] Progressing: [] Met: [] Not Met: [] Adjusted      3.  Patient will demonstrate an increase in Strength to 5/5 to allow for

## 2023-08-03 ENCOUNTER — HOSPITAL ENCOUNTER (OUTPATIENT)
Dept: PHYSICAL THERAPY | Age: 28
Setting detail: THERAPIES SERIES
Discharge: HOME | End: 2023-08-03
Payer: OTHER GOVERNMENT

## 2023-08-03 PROCEDURE — 97110 THERAPEUTIC EXERCISES: CPT | Performed by: SPECIALIST/TECHNOLOGIST

## 2023-08-03 PROCEDURE — 97112 NEUROMUSCULAR REEDUCATION: CPT | Performed by: SPECIALIST/TECHNOLOGIST

## 2023-08-03 PROCEDURE — 97140 MANUAL THERAPY 1/> REGIONS: CPT | Performed by: SPECIALIST/TECHNOLOGIST

## 2023-08-03 PROCEDURE — 97016 VASOPNEUMATIC DEVICE THERAPY: CPT | Performed by: SPECIALIST/TECHNOLOGIST

## 2023-08-11 ENCOUNTER — HOSPITAL ENCOUNTER (OUTPATIENT)
Dept: PHYSICAL THERAPY | Age: 28
Setting detail: THERAPIES SERIES
Discharge: HOME | End: 2023-08-11
Payer: OTHER GOVERNMENT

## 2023-08-11 PROCEDURE — 97016 VASOPNEUMATIC DEVICE THERAPY: CPT | Performed by: SPECIALIST/TECHNOLOGIST

## 2023-08-11 PROCEDURE — 97112 NEUROMUSCULAR REEDUCATION: CPT | Performed by: SPECIALIST/TECHNOLOGIST

## 2023-08-11 PROCEDURE — 97110 THERAPEUTIC EXERCISES: CPT | Performed by: SPECIALIST/TECHNOLOGIST

## 2023-08-11 NOTE — FLOWSHEET NOTE
Manual Intervention ([id number])                                              NMR re-education (27379)   CUES NEEDED    W/Y  15x  x   SL ER/ abd 5# 30x  x   Wall push up  15x  x   PB rows/ ext BlK 30x  x   PB IR/ER BlK 30x  x   Body Blade 30\" x 3                           Therapeutic Activity (61850)                     vaso  10 min                   Recipharm access code: [email or web address]           Therapeutic Exercise and NMR EXR  [x] (37167) Provided verbal/tactile cueing for activities related to strengthening, flexibility, endurance, ROM  for improvements in scapular, scapulothoracic and UE control with self care, reaching, carrying, lifting, house/yardwork, driving/computer work.    [] (53454) Provided verbal/tactile cueing for activities related to improving balance, coordination, kinesthetic sense, posture, motor skill, proprioception  to assist with  scapular, scapulothoracic and UE control with self care, reaching, carrying, lifting, house/yardwork, driving/computer work. Therapeutic Activities:    [x] (81533 or 46227) Provided verbal/tactile cueing for activities related to improving balance, coordination, kinesthetic sense, posture, motor skill, proprioception and motor activation to allow for proper function of scapular, scapulothoracic and UE control with self care, carrying, lifting, driving/computer work.      Home Exercise Program:    [x] (07878) Reviewed/Progressed HEP activities related to strengthening, flexibility, endurance, ROM of scapular, scapulothoracic and UE control with self care, reaching, carrying, lifting, house/yardwork, driving/computer work  [] (35808) Reviewed/Progressed HEP activities related to improving balance, coordination, kinesthetic sense, posture, motor skill, proprioception of scapular, scapulothoracic and UE control with self care, reaching, carrying, lifting, house/yardwork, driving/computer work      Manual Treatments:  PROM / STM / Oscillations-Mobs:  G-I, II, III,

## 2023-08-18 ENCOUNTER — HOSPITAL ENCOUNTER (OUTPATIENT)
Dept: PHYSICAL THERAPY | Age: 28
Setting detail: THERAPIES SERIES
Discharge: HOME | End: 2023-08-18
Payer: OTHER GOVERNMENT

## 2023-08-18 PROCEDURE — 97110 THERAPEUTIC EXERCISES: CPT

## 2023-08-18 PROCEDURE — 97530 THERAPEUTIC ACTIVITIES: CPT

## 2023-08-18 PROCEDURE — 97161 PT EVAL LOW COMPLEX 20 MIN: CPT

## 2023-08-18 NOTE — PLAN OF CARE
[]Excellent   [x]Good    []Fair   []Poor    Physical Therapy Evaluation Complexity Justification  [x] A history of present problem with:  [] no personal factors and/or comorbidities that impact the plan of care;  [x]1-2 personal factors and/or comorbidities that impact the plan of care  []3 personal factors and/or comorbidities that impact the plan of care  [x] An examination of body systems using standardized tests and measures addressing any of the following: body structures and functions (impairments), activity limitations, and/or participation restrictions;:  [] a total of 1-2 or more elements   [x] a total of 3 or more elements   [] a total of 4 or more elements   [x] A clinical presentation with:  [x] stable and/or uncomplicated characteristics   [] evolving clinical presentation with changing characteristics  [] unstable and unpredictable characteristics;   [x] Clinical decision making of [x] Low, [] moderate, [] high complexity using standardized patient assessment instrument and/or measurable assessment of functional outcome. [x] EVAL (LOW) 76303 (typically 15 minutes face-to-face)  [] EVAL (MOD) 87937 (typically 30 minutes face-to-face)  [] EVAL (HIGH) 52708 (typically 45 minutes face-to-face)  [] RE-EVAL     PLAN:   Frequency/Duration:  1-2 days per week for 6-8 Weeks:  Interventions:  [x]  Therapeutic exercise including: strength training, ROM, for Lower extremity and core   [x]  NMR activation and proprioception for LE, Glutes and Core   [x]  Manual therapy as indicated for LE, Hip and spine to include: Dry Needling/IASTM, STM, PROM, Gr I-IV mobilizations, manipulation. [x] Modalities as needed that may include: thermal agents, E-stim, Biofeedback, US, iontophoresis as indicated  [x] Patient education on joint protection, postural re-education, activity modification, progression of HEP. HEP instruction: Written HEP instructions provided and reviewed.      GOALS:  Patient stated goal: be pain

## 2023-08-18 NOTE — FLOWSHEET NOTE
[address]  Phone: [phone]   Fax: [phone]    Physical Therapy Daily Treatment Note    Date:  2023     Patient Name:  Aiyana Bermudez    :  1995  MRN: [id number]  Medical Diagnosis:  Acute pain of right shoulder [M25.511]  Treatment Diagnosis: decreased R hip ROM, flexibility, and strength, impaired gait and dynamic mobility   Insurance/Certification information: [id number] $30 COPAY  Physician Information:  Minnie Strickland MD  Plan of care signed (Y/N): []  Yes [x]  No     Date of Patient follow up with Physician:      Progress Report: []  Yes  [x]  No     Date Range for reporting period:  Beginnin2023  Ending:     Progress report due (10 Rx/or 30 days whichever is less): visit #10 or      Recertification due (POC duration/ or 90 days whichever is less): visit #16 or 10/13/23 (8 weeks)     Visit # Insurance Allowable Auth required?  Date Range   1 [place] []  Yes  []  No PCY     Latex Allergy:  [x]NO      []YES  Preferred Language for Healthcare:   [x]English       []other:    Functional Scale:       Date assessed:  LEFS: raw score = 69; dysfunction = 14%  23    Pain level:  3-4/10     SUBJECTIVE:  See eval    OBJECTIVE: See eval      RESTRICTIONS/PRECAUTIONS: n/a     Exercises/Interventions:     Therapeutic Exercise (97929)  Resistance / level Sets x Reps Notes / Cues   Bike      Hip flexor stretch  30\" x 2 With chair, and in half kneel   Hip ADD stretch  30\" x 2    Clamshells      Reverse Clamshells  2 x 15    Seated IR w/ BS and TB green 3\" x 20                      Therapeutic Activities (44385)      Patient Education  10' Diagnosis, prognosis, POC, DN                     Neuromuscular Re-ed (49958)                        Manual Intervention ([id number])      Knee mobs/PROM      Tib/Fem Mobs      Patella Mobs      Ankle mobs                      Modalities:     Pt. Education:  2023  -pt educated on

## 2023-08-23 ENCOUNTER — HOSPITAL ENCOUNTER (OUTPATIENT)
Dept: PHYSICAL THERAPY | Age: 28
Setting detail: THERAPIES SERIES
Discharge: HOME | End: 2023-08-23
Payer: OTHER GOVERNMENT

## 2023-08-23 PROCEDURE — 97530 THERAPEUTIC ACTIVITIES: CPT | Performed by: SPECIALIST/TECHNOLOGIST

## 2023-08-23 PROCEDURE — 97110 THERAPEUTIC EXERCISES: CPT | Performed by: SPECIALIST/TECHNOLOGIST

## 2023-08-23 PROCEDURE — 97112 NEUROMUSCULAR REEDUCATION: CPT | Performed by: SPECIALIST/TECHNOLOGIST

## 2023-08-25 ENCOUNTER — HOSPITAL ENCOUNTER (OUTPATIENT)
Dept: PHYSICAL THERAPY | Age: 28
Setting detail: THERAPIES SERIES
Discharge: HOME | End: 2023-08-25
Payer: OTHER GOVERNMENT

## 2023-08-25 PROCEDURE — 97110 THERAPEUTIC EXERCISES: CPT | Performed by: SPECIALIST/TECHNOLOGIST

## 2023-08-25 PROCEDURE — 97112 NEUROMUSCULAR REEDUCATION: CPT | Performed by: SPECIALIST/TECHNOLOGIST

## 2023-08-25 PROCEDURE — 97140 MANUAL THERAPY 1/> REGIONS: CPT | Performed by: SPECIALIST/TECHNOLOGIST

## 2023-08-25 NOTE — FLOWSHEET NOTE
prepare for flight [employer]   [] Progressing: [] Met: [] Not Met: [] Adjusted     Therapist goals for Patient:   Short Term Goals: To be achieved in: 2 weeks  1. Independent in HEP and progression per patient tolerance, in order to prevent re-injury. [] Progressing: [] Met: [] Not Met: [] Adjusted  2. Patient will have a decrease in pain to facilitate improvement in movement, function, and ADLs as indicated by Functional Deficits. [] Progressing: [] Met: [] Not Met: [] Adjusted     Long Term Goals: To be achieved in: 8 weeks  1. Pt will improve LEFS by 9 points to reduce disability and progress towards PLOF. [] Progressing: [] Met: [] Not Met: [] Adjusted  2. Patient will demonstrate increased AROM to > 20deg IR to allow for proper joint functioning as indicated by patients Functional Deficits. [] Progressing: [] Met: [] Not Met: [] Adjusted  3. Patient will demonstrate an increase in Strength to at least 5/5 R LE as well as good proximal hip strength and control to allow for proper functional mobility as indicated by patients Functional Deficits. [] Progressing: [] Met: [] Not Met: [] Adjusted  4. Patient will return to functional activities including running > 3 miles without increased symptoms or restriction. [] Progressing: [] Met: [] Not Met: [] Adjusted    Overall Progression Towards Functional goals/ Treatment Progress Update:  [] Patient is progressing as expected towards functional goals listed. [] Progression is slowed due to complexities/Impairments listed. [] Progression has been slowed due to co-morbidities.   [x] Plan just implemented, too soon to assess goals progression <30days   [] Goals require adjustment due to lack of progress  [] Patient is not progressing as expected and requires additional follow up with physician  [] Other    Persisting Functional

## 2023-08-29 ENCOUNTER — HOSPITAL ENCOUNTER (OUTPATIENT)
Dept: PHYSICAL THERAPY | Age: 28
Setting detail: THERAPIES SERIES
Discharge: HOME | End: 2023-08-29
Payer: OTHER GOVERNMENT

## 2023-08-29 PROCEDURE — 97112 NEUROMUSCULAR REEDUCATION: CPT | Performed by: SPECIALIST/TECHNOLOGIST

## 2023-08-29 PROCEDURE — 97110 THERAPEUTIC EXERCISES: CPT | Performed by: SPECIALIST/TECHNOLOGIST

## 2023-08-29 NOTE — FLOWSHEET NOTE
Limitations/Impairments:  []Sitting []Standing   []Walking []Stairs   []Transfers []ADLs   []Squatting/bending []Kneeling  []Housework []Job related tasks  []Driving []Sports/Recreation   []Sleeping []Other:    ASSESSMENT:  Good tolerance today. Noted fatigue at end of session. Held hip flexor stretch. Treatment/Activity Tolerance:  [x] Pt able to complete treatment [] Patient limited by fatique  [] Patient limited by pain  [] Patient limited by other medical complications  [] Other:     Prognosis:  [x] Good [] Fair  [] Poor    Patient Requires Follow-up: [x] Yes  [] No    Return to Play:    [x]  N/A   []  Stage 1: Intro to Strength   []  Stage 2: Return to Run and Strength   []  Stage 3: Return to Jump and Strength   []  Stage 4: Dynamic Strength and Agility   []  Stage 5: Sport Specific Training     []  Ready to Return to Play, Meets All Above Stages   []  Not Ready for Return to Sports   Comments:            Plan for next treatment session:     PLAN: See eval. PT 1-2x / week for 6-8 weeks. [x] Continue per plan of care [] Alter current plan (see comments)  [] Plan of care initiated [] Hold pending MD visit [] Discharge    Electronically signed by: Veda Torres PTA, MHI, ATC     Note: If patient does not return for scheduled/ recommended follow up visits, this note will serve as a discharge from care along with most recent update on progress.

## 2023-08-31 ENCOUNTER — HOSPITAL ENCOUNTER (OUTPATIENT)
Dept: PHYSICAL THERAPY | Age: 28
Setting detail: THERAPIES SERIES
Discharge: HOME | End: 2023-08-31
Payer: OTHER GOVERNMENT

## 2023-08-31 PROCEDURE — 97140 MANUAL THERAPY 1/> REGIONS: CPT | Performed by: SPECIALIST/TECHNOLOGIST

## 2023-08-31 PROCEDURE — 97110 THERAPEUTIC EXERCISES: CPT | Performed by: SPECIALIST/TECHNOLOGIST

## 2023-08-31 PROCEDURE — 97112 NEUROMUSCULAR REEDUCATION: CPT | Performed by: SPECIALIST/TECHNOLOGIST

## 2023-08-31 PROCEDURE — 97016 VASOPNEUMATIC DEVICE THERAPY: CPT | Performed by: SPECIALIST/TECHNOLOGIST

## 2023-08-31 NOTE — FLOWSHEET NOTE
[address]  Phone: [phone]   Fax: [phone]    Physical Therapy Daily Treatment Note    Date:  2023     Patient Name:  Blake Small    :  1995  MRN: [id number]  Medical Diagnosis:  Acute pain of right shoulder [M25.511]  Treatment Diagnosis: decreased R hip ROM, flexibility, and strength, impaired gait and dynamic mobility   Insurance/Certification information: [id number] ([place]) $30 COPAY  Physician Information:  Alonzo Blankenship MD  Plan of care signed (Y/N): []  Yes [x]  No     Date of Patient follow up with Physician:      Progress Report: []  Yes  [x]  No     Date Range for reporting period:  Beginnin2023  Ending:     Progress report due (10 Rx/or 30 days whichever is less): visit #10 or      Recertification due (POC duration/ or 90 days whichever is less): visit #16 or 10/13/23 (8 weeks)     Visit # Insurance Allowable Auth required? Date Range   5  6  11 [place]  Previous for his shoulder  Total []  Yes  []  No PCY     Latex Allergy:  [x]NO      []YES  Preferred Language for Healthcare:   [x]English       []other:    Functional Scale:       Date assessed:  LEFS: raw score = 69; dysfunction = 14%  23    Pain level:  3-4/10     SUBJECTIVE:   Pt notes his hip has been feeling good just a little tight. He worked out heavy yesterday at [employer] and his shoulder is a little sore today.    OBJECTIVE: See eval      RESTRICTIONS/PRECAUTIONS: n/a     Exercises/Interventions:     Therapeutic Exercise (77389)  Resistance / level Sets x Reps Notes / Cues   Eliptical 8' Lvl 4      Hip flexor stretch   1' x 2 Supine off table    Hip ADD stretch  1' x 2    Clamshells      Reverse Clamshells  2 x 15    Seated IR w/ BS and TB green 3\" x 20    Figure 4 stretch/piriformis   10\" x 10 ea    Shoulder:   Swiss Ball Ext  W  Horab   3#  3#  3#   X30  X30  x30    CC Lat pulls        Ext        Row        Tricep   Body Blade  Monster

## 2023-09-08 ENCOUNTER — HOSPITAL ENCOUNTER (OUTPATIENT)
Dept: PHYSICAL THERAPY | Age: 28
Setting detail: THERAPIES SERIES
Discharge: HOME | End: 2023-09-08
Payer: OTHER GOVERNMENT

## 2023-09-08 PROCEDURE — 97530 THERAPEUTIC ACTIVITIES: CPT | Performed by: SPECIALIST/TECHNOLOGIST

## 2023-09-08 PROCEDURE — 97112 NEUROMUSCULAR REEDUCATION: CPT | Performed by: SPECIALIST/TECHNOLOGIST

## 2023-09-08 PROCEDURE — 97140 MANUAL THERAPY 1/> REGIONS: CPT | Performed by: SPECIALIST/TECHNOLOGIST

## 2023-09-08 PROCEDURE — 97110 THERAPEUTIC EXERCISES: CPT | Performed by: SPECIALIST/TECHNOLOGIST

## 2023-09-08 NOTE — FLOWSHEET NOTE
Limitations/Impairments:  []Sitting []Standing   []Walking []Stairs   []Transfers []ADLs   []Squatting/bending []Kneeling  []Housework []Job related tasks  []Driving []Sports/Recreation   []Sleeping []Other:    ASSESSMENT:  Good tolerance today. Noted fatigue at end of session. Treatment/Activity Tolerance:  [x] Pt able to complete treatment [] Patient limited by fatique  [] Patient limited by pain  [] Patient limited by other medical complications  [] Other:     Prognosis:  [x] Good [] Fair  [] Poor    Patient Requires Follow-up: [x] Yes  [] No    Return to Play:    [x]  N/A   []  Stage 1: Intro to Strength   []  Stage 2: Return to Run and Strength   []  Stage 3: Return to Jump and Strength   []  Stage 4: Dynamic Strength and Agility   []  Stage 5: Sport Specific Training     []  Ready to Return to Play, Meets All Above Stages   []  Not Ready for Return to Sports   Comments:            Plan for next treatment session:     PLAN: See eval. PT 1-2x / week for 6-8 weeks. [x] Continue per plan of care [] Alter current plan (see comments)  [] Plan of care initiated [] Hold pending MD visit [] Discharge    Electronically signed by: Chilango Al, KENNEDY, MHI, ATC     Note: If patient does not return for scheduled/ recommended follow up visits, this note will serve as a discharge from care along with most recent update on progress.

## 2023-09-15 ENCOUNTER — HOSPITAL ENCOUNTER (OUTPATIENT)
Dept: PHYSICAL THERAPY | Age: 28
Setting detail: THERAPIES SERIES
Discharge: HOME | End: 2023-09-15
Payer: OTHER GOVERNMENT

## 2023-09-15 PROCEDURE — 97530 THERAPEUTIC ACTIVITIES: CPT | Performed by: SPECIALIST/TECHNOLOGIST

## 2023-09-15 PROCEDURE — 97112 NEUROMUSCULAR REEDUCATION: CPT | Performed by: SPECIALIST/TECHNOLOGIST

## 2023-09-15 PROCEDURE — 97110 THERAPEUTIC EXERCISES: CPT | Performed by: SPECIALIST/TECHNOLOGIST

## 2023-09-21 ENCOUNTER — APPOINTMENT (OUTPATIENT)
Dept: PHYSICAL THERAPY | Age: 28
End: 2023-09-21
Payer: OTHER GOVERNMENT

## 2023-12-26 ENCOUNTER — OFFICE VISIT (OUTPATIENT)
Dept: FAMILY MEDICINE CLINIC | Age: 28
End: 2023-12-26
Payer: OTHER GOVERNMENT

## 2023-12-26 VITALS
OXYGEN SATURATION: 94 % | HEIGHT: 70 IN | BODY MASS INDEX: 22.39 KG/M2 | DIASTOLIC BLOOD PRESSURE: 64 MMHG | HEART RATE: 94 BPM | WEIGHT: 156.4 LBS | SYSTOLIC BLOOD PRESSURE: 120 MMHG

## 2023-12-26 DIAGNOSIS — G43.009 MIGRAINE WITHOUT AURA AND WITHOUT STATUS MIGRAINOSUS, NOT INTRACTABLE: Primary | ICD-10-CM

## 2023-12-26 PROCEDURE — 99213 OFFICE O/P EST LOW 20 MIN: CPT | Performed by: STUDENT IN AN ORGANIZED HEALTH CARE EDUCATION/TRAINING PROGRAM

## 2023-12-26 RX ORDER — SUMATRIPTAN 100 MG/1
100 TABLET, FILM COATED ORAL 2 TIMES DAILY PRN
Qty: 9 TABLET | Refills: 5 | Status: SHIPPED | OUTPATIENT
Start: 2023-12-26

## 2023-12-26 NOTE — PROGRESS NOTES
Keegan Hernandez II (:  1995) is a 25 y.o. male,Established patient, here for evaluation of the following chief complaint(s):  Headache         ASSESSMENT/PLAN:  1. Migraine without aura and without status migrainosus, not intractable  -     SUMAtriptan (IMITREX) 100 MG tablet; Take 1 tablet by mouth 2 times daily as needed for Migraine, Disp-9 tablet, R-5Normal  Likely chronic. Patient has had intermittent problems with this for years. Patient was on sumatriptan previously, does not overtaking this. Advised 600 to 800 mg of ibuprofen for migraine first.  If this does not improve after the first couple of migraines, advised to go back to the Imitrex that patient was prescribed previously. Advise no more than 2 doses of the Imitrex in 1 day. Patient is moving to [place] in 4 days. No follow-ups on file. Subjective   SUBJECTIVE/OBJECTIVE:  HPI  Patient is a 78-year-old male, who presents to clinic for history of headache. Patient states that he has been diagnosed with migraines in the past.  It does seem that the patient was previously on sumatriptan and, but this was 8 years ago and the patient did not remember taking his medication. Patient has attempted Excedrin Migraine and naproxen x 1, which did not seem to help very much with his headache. Patient has not tried ibuprofen. Patient tries to stay well-hydrated. Patient states that his headache is unilateral, can last for hours sometimes. He reports that it is not debilitating, but can affect his eyesight with flashes of light. No other neurological symptoms noted. Review of Systems   All other systems reviewed and are negative. Objective     Vitals:    23 1317   BP: 120/64   Pulse: 94   SpO2: 94%   Weight: 70.9 kg (156 lb 6.4 oz)   Height: 1.778 m (5' 10\")       Physical Exam  Vitals reviewed. Constitutional:       General: He is not in acute distress. Appearance: Normal appearance.  He is not

## 2024-03-29 NOTE — PROGRESS NOTES
Left VM stating Vit D Insufficiency as prior discussed with pt - will start VitD 3 ergocalciferol 50,000 U qwk x 12 weeks. Patient to follow up with PCP for lab recheck and maintenance dosing as needed. Initial rx placed. well developed, well nourished , in no acute distress , ambulating without difficulty , normal communication ability

## 2024-05-15 NOTE — TELEPHONE ENCOUNTER
I ordered losartan 25mg/d when he came in with a very high BP on 4/1/24.     He has been contacted a few times to report his BP readings so that I can adjust the dose of losartan. I did not expect his BP to be controlled with the lowest dose of losartan but needed a starting point.    So why has he not been taking losartan? Did he notice side effects?    I do not recommend metoprolol because it can lower the heart rate. At a recent appointment, his heart rate was close to the lower end of normal (60). I do not want to lower it further.    I will order losartan 50mg daily since the lower dose did not help. He will need to some in for a BP check within the next 1-2 weeks and the medication can be adjusted.   NO MRI not received. They are suppose to being sending it again.  KB